# Patient Record
Sex: MALE | Race: WHITE | Employment: UNEMPLOYED | URBAN - METROPOLITAN AREA
[De-identification: names, ages, dates, MRNs, and addresses within clinical notes are randomized per-mention and may not be internally consistent; named-entity substitution may affect disease eponyms.]

---

## 2019-04-10 ENCOUNTER — HOSPITAL ENCOUNTER (OUTPATIENT)
Age: 24
Discharge: HOME | End: 2019-04-10

## 2020-10-02 ENCOUNTER — OFFICE VISIT (OUTPATIENT)
Dept: FAMILY MEDICINE CLINIC | Age: 25
End: 2020-10-02
Payer: COMMERCIAL

## 2020-10-02 VITALS
HEIGHT: 71 IN | WEIGHT: 288.8 LBS | BODY MASS INDEX: 40.43 KG/M2 | DIASTOLIC BLOOD PRESSURE: 78 MMHG | OXYGEN SATURATION: 99 % | TEMPERATURE: 98 F | HEART RATE: 76 BPM | SYSTOLIC BLOOD PRESSURE: 135 MMHG

## 2020-10-02 PROBLEM — M79.672 LEFT FOOT PAIN: Status: RESOLVED | Noted: 2020-10-02 | Resolved: 2020-10-02

## 2020-10-02 PROBLEM — M79.672 LEFT FOOT PAIN: Status: ACTIVE | Noted: 2020-10-02

## 2020-10-02 PROBLEM — F11.90 OPIOID USE DISORDER: Status: ACTIVE | Noted: 2020-10-02

## 2020-10-02 PROBLEM — L72.9 SCALP CYST: Status: ACTIVE | Noted: 2020-10-02

## 2020-10-02 PROBLEM — G89.29 CHRONIC FOOT PAIN, LEFT: Status: ACTIVE | Noted: 2020-10-02

## 2020-10-02 PROBLEM — E66.01 CLASS 3 SEVERE OBESITY DUE TO EXCESS CALORIES WITHOUT SERIOUS COMORBIDITY WITH BODY MASS INDEX (BMI) OF 40.0 TO 44.9 IN ADULT (HCC): Status: ACTIVE | Noted: 2020-10-02

## 2020-10-02 PROCEDURE — 99204 OFFICE O/P NEW MOD 45 MIN: CPT | Performed by: FAMILY MEDICINE

## 2020-10-02 RX ORDER — NAPROXEN 500 MG/1
TABLET ORAL
COMMUNITY
Start: 2020-08-21 | End: 2020-10-02

## 2020-10-02 SDOH — ECONOMIC STABILITY: FOOD INSECURITY: WITHIN THE PAST 12 MONTHS, THE FOOD YOU BOUGHT JUST DIDN'T LAST AND YOU DIDN'T HAVE MONEY TO GET MORE.: NEVER TRUE

## 2020-10-02 SDOH — ECONOMIC STABILITY: INCOME INSECURITY: HOW HARD IS IT FOR YOU TO PAY FOR THE VERY BASICS LIKE FOOD, HOUSING, MEDICAL CARE, AND HEATING?: NOT HARD AT ALL

## 2020-10-02 SDOH — HEALTH STABILITY: MENTAL HEALTH: HOW OFTEN DO YOU HAVE A DRINK CONTAINING ALCOHOL?: NEVER

## 2020-10-02 SDOH — ECONOMIC STABILITY: FOOD INSECURITY: WITHIN THE PAST 12 MONTHS, YOU WORRIED THAT YOUR FOOD WOULD RUN OUT BEFORE YOU GOT MONEY TO BUY MORE.: NEVER TRUE

## 2020-10-02 ASSESSMENT — PATIENT HEALTH QUESTIONNAIRE - PHQ9
SUM OF ALL RESPONSES TO PHQ QUESTIONS 1-9: 0
SUM OF ALL RESPONSES TO PHQ9 QUESTIONS 1 & 2: 0
1. LITTLE INTEREST OR PLEASURE IN DOING THINGS: 0
2. FEELING DOWN, DEPRESSED OR HOPELESS: 0
SUM OF ALL RESPONSES TO PHQ QUESTIONS 1-9: 0

## 2020-10-02 ASSESSMENT — ENCOUNTER SYMPTOMS
GASTROINTESTINAL NEGATIVE: 1
BOWEL INCONTINENCE: 0
RESPIRATORY NEGATIVE: 1
EYES NEGATIVE: 1
ALLERGIC/IMMUNOLOGIC NEGATIVE: 1
NAUSEA: 0
VOMITING: 0

## 2020-10-02 NOTE — PATIENT INSTRUCTIONS
Patient Education        Learning About Opioid Use Disorder  What is opioid use disorder? Opioid use disorder means that a person uses opioids even though it causes harm to themselves or others. It can range from mild to severe. The more signs of this disorder you have, the more severe it may be. Moderate to severe opioid use disorder is sometimes called addiction. People who have it may find it hard to control their use. This disorder can develop from the use of any type of opioid. Prescription ones include hydrocodone, oxycodone, fentanyl, and morphine. Heroin is an example of an illegal opioid. Opioids can be dangerous. Taking too much can cause:  · Trouble breathing. · Low blood pressure. · A low heart rate. · A coma. · Death. Many people with this disorder, and sometimes their families, feel embarrassed or ashamed. Don't let these feelings  the way of getting treatment. Remember that this disorder can happen to anyone who uses opioids, no matter what the reason. What are the signs? You may have opioid use disorder if two or more of the following are true. The more signs of this disorder you have, the more severe it may be. · You use larger amounts of opioids than you ever meant to. Or you've been using them for a longer period of time than you ever meant to. · You can't cut down or control your use. Or you constantly wish you could cut down. · You spend a lot of time getting or using opioids or recovering from the effects. · You have strong cravings for opioids. · You can no longer do your main jobs at work, at school, or at home. · You keep using, even though your opioid use hurts your relationships. · You have stopped doing important activities because of your opioid use. · You use opioids in situations where doing so is dangerous. · You keep using, even though you know it is causing health problems.   · You need more and more of the opioids to get the same effect, or you get less effect from the same amount over time. This is called tolerance. · You have uncomfortable symptoms when you stop using opioids or use less. This is called withdrawal.  If you're taking opioids as part of a supervised care plan, tolerance and withdrawal may not mean that you have opioid use disorder. How is opioid use disorder treated? Treatment usually includes medicines, group therapy, one or more types of counseling, and education. Medicines may be used to help you quit. They may help to control cravings, ease withdrawal symptoms, and prevent relapse. This treatment is called medication-assisted treatment, or MAT. During MAT, you take a medicine (usually methadone or buprenorphine) in place of the opioid you were using. Most people take the medicine for months or years as a part of the treatment, along with therapy or counseling. Treatment focuses on more than opioid use. It helps you cope with the anger, frustration, sadness, and disappointment that often happen when a person tries to stop using opioids. Treatment also looks at other parts of your life, like your relationships with friends and family, school and work, medical problems, and living situation. Treatment helps you find and manage problems. It helps you take control of your life so you don't have to depend on opioids. Opioid use disorder affects your whole family. Family counseling often is part of treatment. Urgent treatment for an overdose  Naloxone is a medicine that reverses the effects of an opioid overdose. If you take it or someone gives it to you soon enough after an overdose, it can save your life. Naloxone comes in a rescue kit you can carry with you. Ask your doctor or pharmacist about having a naloxone rescue kit on hand. Follow-up care is a key part of your treatment and safety. Be sure to make and go to all appointments, and call your doctor if you are having problems.  It's also a good idea to know your test results and keep a list of the medicines you take. Where can you learn more? Go to https://[email or web address] org and sign in to your [employer] account. Enter A129 in the [employer] box to learn more about \"Learning About Opioid Use Disorder. \"     If you do not have an account, please click on the \"Sign Up Now\" link. Current as of: August 22, 2019               Content Version: 12.5  © 9873-2385 [employer]. Care instructions adapted under license by [facility]. If you have questions about a medical condition or this instruction, always ask your healthcare professional. [address] any warranty or liability for your use of this information.

## 2020-10-02 NOTE — ASSESSMENT & PLAN NOTE
· I generally recommend that people of all ages try to get 150 minutes of physical activity per week and it doesnt matter how this totals up, in other words 30 minutes 5 days per week is as good as 50 minutes 3 days a week and so on. The level of activity should be such that it is able to get your heart rate up to 100 or more, for example a brisk walk should achieve this rate. · In terms of diet, I generally recommend low-carb and low-fat, trying to avoid processed foods wherever possible (anything that comes in a can or a box) which can be achieved by sticking to the outside walls of the grocery store where generally you will find fresh fruits/vegetables, meats, dairy, and frozen foods. · Try to avoid starches in the diet where possible and minimize bread, rice, potatoes, and pasta in the diet. Specifically try to avoid gluten, which even in people that dont have a [place] allergy, causes havoc in the small intestine and alters absorption of nutrients which can in turn lead to obesity.

## 2020-10-02 NOTE — PROGRESS NOTES
by Niall Butler DO on 10/2/20         Subjective     Lois Tolentino is a 22 y.o. male presenting today for   Chief Complaint   Patient presents with    New Patient     needs ref for rehab   . Epidermal Cyst  Patient complains of a subcutaneous nodule located over the scalp. This has been present for several years. There has been no associated symptoms of discharge, tenderness, sudden swelling, or pain. Patient does not have a history of epidermal inclusion cysts. About 10 years ago he had Curran fracture in left foot and casted and just started hurting again recently. About a month ago he went to urgent care. Xrays were negative. Started when he received Percocet medication for wisdom tooth removal. Then after that he was hanging with a bad crowd. Got up to 30 Percocet 30mg daily. Now he's down to 5 Percocet 5mg per day. Wants to go to [facility]. Has tried [facility] but kicked out b/c of insurance not paying for program.     Lois Tolentino is a 22 y.o. male here for discussion regarding weight loss. He has noted a weight gain of approximately 50 pounds over the last several years. He feels ideal weight is 200 pounds. History of eating disorders: none. There is a family history positive for obesity in the self. Previous treatments for obesity include self-directed dieting. Obesity associated medical conditions: none. Obesity associated medications: none. Cardiovascular risk factors besides obesity: none. Drug Problem   Pertinent negatives include no agitation, confusion, delusions, hallucinations, intoxication, loss of consciousness, seizures, self-injury, somnolence, violence or weakness. This is a chronic problem. The current episode started more than 1 year ago. The problem has been gradually improving since onset. Suspected agents include prescription drugs. Pertinent negatives include no bladder incontinence, bowel incontinence, fever, injury, nausea or vomiting.  Past treatments include outpatient rehab (has appointment but needs referral). Foot Pain    The pain is present in the left foot. This is a chronic problem. The current episode started more than 1 year ago. The problem occurs daily. The quality of the pain is described as aching. The pain is moderate. Pertinent negatives include no fever, inability to bear weight, itching, joint locking, joint swelling, limited range of motion, numbness, stiffness or tingling. The symptoms are aggravated by activity. He has tried rest, oral narcotics and NSAIDS (h/o leonard fracture and casting) for the symptoms. The treatment provided no relief. Review of Systems   Review of Systems   Constitutional: Negative. Negative for fever. HENT: Negative. Eyes: Negative. Respiratory: Negative. Cardiovascular: Negative. Gastrointestinal: Negative. Negative for bowel incontinence, nausea and vomiting. Endocrine: Negative. Genitourinary: Negative. Negative for bladder incontinence. Musculoskeletal: Negative. Negative for stiffness. Skin: Negative. Negative for itching. Allergic/Immunologic: Negative. Neurological: Negative. Negative for tingling, seizures, loss of consciousness, weakness and numbness. Hematological: Negative. Psychiatric/Behavioral: Negative. Negative for agitation, confusion, hallucinations and self-injury. All other systems reviewed and are negative. Medications     No current outpatient medications on file. No current facility-administered medications for this visit. Past History    Past Medical History:   has no past medical history on file. Social History:   reports that he has never smoked. He has never used smokeless tobacco. He reports current drug use. Drug: Marijuana. He reports that he does not drink alcohol. Family History: History reviewed. No pertinent family history. Surgical History: History reviewed. No pertinent surgical history.      Physical Examination      Vitals:  /78   Pulse 76   Temp 98 °F (36.7 °C) (Temporal)   Ht 5' 11\" (1.803 m)   Wt 288 lb 12.8 oz (131 kg)   SpO2 99%   BMI 40.28 kg/m²     Physical Exam  Vitals signs and nursing note reviewed. Constitutional:       General: He is not in acute distress. Appearance: Normal appearance. He is obese. He is not ill-appearing, toxic-appearing or diaphoretic. HENT:      Head: Normocephalic and atraumatic. Right Ear: Tympanic membrane, ear canal and external ear normal. There is no impacted cerumen. Left Ear: Tympanic membrane, ear canal and external ear normal. There is no impacted cerumen. Nose: Nose normal. No congestion or rhinorrhea. Mouth/Throat:      Mouth: Mucous membranes are moist.      Pharynx: Oropharynx is clear. No oropharyngeal exudate or posterior oropharyngeal erythema. Eyes:      General: No scleral icterus. Right eye: No discharge. Left eye: No discharge. Extraocular Movements: Extraocular movements intact. Conjunctiva/sclera: Conjunctivae normal.      Pupils: Pupils are equal, round, and reactive to light. Neck:      Musculoskeletal: Normal range of motion and neck supple. No neck rigidity or muscular tenderness. Cardiovascular:      Rate and Rhythm: Normal rate and regular rhythm. Pulses: Normal pulses. Heart sounds: Normal heart sounds. No murmur. No friction rub. No gallop. Pulmonary:      Effort: Pulmonary effort is normal. No respiratory distress. Breath sounds: Normal breath sounds. No stridor. No wheezing, rhonchi or rales. Chest:      Chest wall: No tenderness. Abdominal:      General: Bowel sounds are normal. There is no distension. Palpations: Abdomen is soft. There is no mass. Tenderness: There is no abdominal tenderness. There is no right CVA tenderness, left CVA tenderness, guarding or rebound. Hernia: No hernia is present.    Musculoskeletal: Normal range of motion. General: No deformity or signs of injury. Left foot: Normal range of motion and normal capillary refill. Tenderness present. No bony tenderness, swelling, crepitus, deformity or laceration. Feet:    Skin:     General: Skin is warm. Capillary Refill: Capillary refill takes less than 2 seconds. Coloration: Skin is not jaundiced or pale. Findings: No bruising, erythema, lesion or rash. Comments: 3 subcutaneous nodules as marked. 1-3cm in diameter   Neurological:      General: No focal deficit present. Mental Status: He is alert and oriented to person, place, and time. Mental status is at baseline. Motor: No weakness. Coordination: Coordination normal.      Gait: Gait normal.      Deep Tendon Reflexes: Reflexes normal.   Psychiatric:         Mood and Affect: Mood normal.         Behavior: Behavior normal.         Thought Content: Thought content normal.         Judgment: Judgment normal.         Labs/Imaging/Diagnostics   Labs:  No results found for: CMPWITHGFR, CBCAUTODIF, TSHFT4, LABA1C, LIPIDPAN    Imaging Last 24 Hours:  No image results found.

## 2021-01-26 ENCOUNTER — TELEPHONE (OUTPATIENT)
Dept: SURGERY | Age: 26
End: 2021-01-26

## 2021-01-26 NOTE — TELEPHONE ENCOUNTER
Received a referral by fax for patient to see Dr. Dru Mccann. He was called 3x previously for appt. I called today and his voicemail is full.   He needs to make appt with Dr. Dru Mccann

## 2021-02-01 ENCOUNTER — OFFICE VISIT (OUTPATIENT)
Dept: PODIATRY | Age: 26
End: 2021-02-01
Payer: COMMERCIAL

## 2021-02-01 VITALS — TEMPERATURE: 98.4 F | RESPIRATION RATE: 15 BRPM | HEIGHT: 71 IN | BODY MASS INDEX: 37.38 KG/M2 | WEIGHT: 267 LBS

## 2021-02-01 DIAGNOSIS — M76.72 PERONEAL TENDONITIS, LEFT: ICD-10-CM

## 2021-02-01 DIAGNOSIS — M79.662 PAIN IN LEFT LOWER LEG: Primary | ICD-10-CM

## 2021-02-01 PROCEDURE — 99203 OFFICE O/P NEW LOW 30 MIN: CPT | Performed by: PODIATRIST

## 2021-02-01 RX ORDER — BUPRENORPHINE AND NALOXONE 4; 1 MG/1; MG/1
1 FILM, SOLUBLE BUCCAL; SUBLINGUAL DAILY
COMMUNITY

## 2021-02-01 NOTE — PROGRESS NOTES
vomiting. Lower Extremity Physical Examination:   Vitals:   Vitals:    02/01/21 1015   Resp: 15   Temp: 98.4 °F (36.9 °C)     General: AAO x 3 in NAD. Dermatologic Exam:  Skin lesion/ulceration Absent . Skin No rashes or nodules noted. .       Musculoskeletal:     1st MPJ ROM decreased, Bilateral.  Muscle strength 5/5, Bilateral.  Pain present upon palpation of left ankle along peroneal tendon. Medial longitudinal arch, Bilateral WNL. Ankle ROM pain with eversion and inversion , left. Dorsally contracted digits absent digits 1-5 Bilateral.     Vascular: DP and PT pulses palpable 2/4, Bilateral.  CFT <3 seconds, Bilateral.  Hair growth present to the level of the digits, Bilateral.  Edema noted to left ankle. Varicosities absent, Bilateral. Erythema absent, Bilateral    Neurological: Sensation intact to light touch to level of digits, Bilateral.  Protective sensation intact 10/10 sites via 5.07/10g [place] Monofilament, Bilateral.  negative Tinel's, Bilateral.  negative Valleix sign, Bilateral.      Integument: Warm, dry, supple, Bilateral.  Open lesion absent, Bilateral.  Interdigital maceration absent to web spaces 1-4, Bilateral.  Nails are normal in length, thickness and color 1-5 bilateral.  Fissures absent, Bilateral.       Asessment: Patient is a 22 y.o. male with:    Diagnosis Orders   1. Pain in left lower leg  MRI ANKLE LEFT WO CONTRAST   2. Peroneal tendonitis, left  MRI ANKLE LEFT WO CONTRAST         Plan: Patient examined and evaluated. Current condition and treatment options discussed in detail. Discussed conservative and surgical options with the patient. Advised pt to wear CAM boot at all times when WB to LLE. Pt to limit walking. Ice and elevate at home. May take NSAID as needed. Recommend MRI of left ankle for further evaluation and to r/o injury to soft tissue structures. will await for MRI results.      All labs were reviewed and all imagining including the above

## 2021-02-04 RX ORDER — NAPROXEN 500 MG/1
500 TABLET ORAL 2 TIMES DAILY PRN
Qty: 60 TABLET | Refills: 0 | Status: SHIPPED | OUTPATIENT
Start: 2021-02-04

## 2021-02-19 ENCOUNTER — TELEPHONE (OUTPATIENT)
Dept: PODIATRY | Age: 26
End: 2021-02-19

## 2021-02-19 NOTE — TELEPHONE ENCOUNTER
Left message on [email or web address] that the MRI was approved, and to call and schedule an  Appointment to review MRI

## 2021-02-24 ENCOUNTER — OFFICE VISIT (OUTPATIENT)
Dept: SURGERY | Age: 26
End: 2021-02-24
Payer: COMMERCIAL

## 2021-02-24 VITALS
HEART RATE: 75 BPM | DIASTOLIC BLOOD PRESSURE: 76 MMHG | HEIGHT: 72 IN | OXYGEN SATURATION: 96 % | BODY MASS INDEX: 36.87 KG/M2 | SYSTOLIC BLOOD PRESSURE: 120 MMHG | WEIGHT: 272.2 LBS

## 2021-02-24 DIAGNOSIS — L72.11 PILAR CYSTS: Primary | ICD-10-CM

## 2021-02-24 PROCEDURE — 99203 OFFICE O/P NEW LOW 30 MIN: CPT | Performed by: PLASTIC SURGERY

## 2021-02-24 RX ORDER — DOCUSATE SODIUM 100 MG/1
100 CAPSULE, LIQUID FILLED ORAL PRN
COMMUNITY
Start: 2021-02-07

## 2021-02-24 NOTE — PROGRESS NOTES
[address] [facility]  [facility] [id number]       History and Physical     Chief Complaint   Patient presents with    New Patient     3 cyst on scalp. Pt states have been the same size for 10 years. HPI:   Yolanda Ruiz is a 22 y.o. male who presents with multiple cysts of the scalp. Patient states that they have been present for approximately 10 years. Patient denies any pain associated with the area. Patient is here for evaluation and treatment. . Patient states that he continues to have of the cyst there is nothing that improves the area. There is nothing that makes them go away. Medications:     Current Outpatient Medications   Medication Sig Dispense Refill     MG capsule Take 100 mg by mouth as needed      Melatonin 5 MG CAPS Take 5 mg by mouth as needed      naproxen (NAPROSYN) 500 MG tablet Take 1 tablet by mouth 2 times daily as needed for Pain 60 tablet 0    buprenorphine-naloxone (SUBOXONE) 4-1 MG FILM SL film Place 1 Film under the tongue daily. No current facility-administered medications for this visit. Allergies:   No Known Allergies  Review of Systems:   Constitutional: Negative for fever, chills, fatigue and unexpected weight change. HENT: Negative for hearing loss, sore throat and facial swelling. Eyes: Negative for pain and discharge. Respiratory: Negative for cough and shortness of breath. Cardiovascular: Negative for chest pain. Gastrointestinal: Negative for nausea, vomiting, diarrhea and constipation. Skin: Negative for pallor and rash. Neurological: Negative for seizures, syncope and numbness. Hematological: Does not bruise/bleed easily. Psychiatric/Behavioral: Negative for behavioral problems. Patient has a history of drug abuse.     Past Medical History:   Diagnosis Date    Drug abuse [facility])      Past Surgical History:   Procedure Laterality Date    WISDOM TOOTH Pulmonary/Chest: Effort normal. No respiratory distress. Neurological: Alert and oriented to person, place, and time. Skin:    Left anterior scalp cyst    Right  anterior scalp cyst    Posterior scalp cyst         Psychiatric: Normal mood and affect. Behavior is normal    Imaging:   [unfilled]        Impression/Plan:      Diagnosis Orders   1. Pilar cysts       Patient Active Problem List   Diagnosis    Opioid use disorder ([facility] [address]    Scalp cyst    Chronic foot pain, left    Class 3 severe obesity due to excess calories without serious comorbidity with body mass index (BMI) of 40.0 to 44.9 in adult [facility])         Plan:  Patient with pilar cyst of the scalp. The cysts are located in the following areas: On the right anterior scalp, the left anterior scalp and posterior scalp. The risk of excision was discussed with the patient. I discussed in office excision versus or excision. Patient elected to have an in office procedure. All questions were answered. The following information was discussed with the patient, but this is not an all-inclusive-other issue were also discussed with patient. GENERAL INFORMATION  The surgical removal of skin lesions and tumors is frequently performed by plastic surgeons. Certain skin lesions and skin tumors will not disappear spontaneously, surgical removal is a treatment option. There are many different techniques for removing skin lesions and skin tumors. ALTERNATIVE TREATMENTS  Alternative forms of management consist of not treating the skin lesion/skin tumor condition. Removal of skin lesions and some superficial skin tumors may be accomplished by other treatments including the use of liquid nitrogen (freezing), lasers, topical medications, and electric cautery. Risks and potential complications are associated with alternative forms of treatment. Deeper tumors cannot be treated by this.   RISKS OF SKIN LESION/SKIN TUMOR SURGERY    I have explained to the patient that every surgical procedure involves a certain amount of risk and it is important that an understanding of these risks and the possible complications associated with them is understood. In addition, every procedure has limitations. An individual's choice to undergo a surgical procedure is based on the comparison of the risk to potential benefit. Although some of patients do not experience these complications. Bleeding- It is possible, to experience a bleeding episode during or after surgery. Intraoperative blood transfusions may be required. Should post-operative bleeding occur, it may require an emergency treatment to drain the accumulated blood or blood transfusion. Do not take any aspirin or anti-inflammatory medications for ten days before or after surgery, as this may increase the risk of bleeding. Non-prescription herbs and dietary supplements can increase the risk of surgical bleeding. Hematoma can occur at any time following injury. If blood transfusions are necessary to treat blood loss, there is the risk of blood-related infections such as hepatitis and HIV (AIDS). Heparin medications that are used to prevent blood clots in veins can produce bleeding and decreased blood platelets. Please check with the physician who prescribed that blood thinners such as aspirin Coumadin etc. Prior to stopping them. Infection- Infection can occur after surgery. Should an infection occur, additional treatment including antibiotics, hospitalization, or additional surgery may be necessary. Scarring- All surgery leaves scars, some more visible than others. Although wound healing after a surgical procedure is expected, abnormal scars may occur within the skin and deeper tissues. Scars may be unattractive and of different color than the surrounding skin tone. Scar appearance may also vary within the same scar.   There is the possibility of visible marks from sutures used to close the wound after the removal of skin lesions and tumors. There is the possibility that scars may limit motion and function. In some cases, scars may require surgical revision or treatment. Obesity: If you're BMI is greater than 30 you may have a higher chance of complications. This may include but not limited to wound healing and infections. Also, if you have other medical problems such as diabetes and hypertension it may affect your healing as well as your surgical results in bleeding. Damage to Deeper Structures- There is the potential for injury to deeper structures including nerves, blood vessels, muscles, and lungs (pneumothorax) during any surgical procedure. The potential for this to occur varies according to where on the body surgery is being performed. Injury to deeper structures may be temporary or permanent. Cancer- In some situations, a skin lesion or tumor that appears to be benign may be determined to be cancerous after laboratory analysis. Additional treatments or surgery may be necessary. Recurrence- In some situation, skin lesions and tumors can recur after surgical excision. Additional treatment or secondary surgery may be necessary. Skin Discoloration / Swelling- Some bruising and swelling normally occurs following surgery. The skin in or near the surgical site can appear either lighter or darker than surrounding skin. Although uncommon, swelling and skin discoloration may persist for long periods of time and, in rare situations, may be permanent. Skin Sensitivity- Itching, tenderness, or exaggerated responses to hot or cold temperatures may occur after surgery. Usually this resolves during healing, but in some situations, it may be chronic, permanent. Pain- You will experience pain after your surgery. Pain of varying intensity and duration may occur and persist after surgery. Chronic pain may occur from nerves becoming trapped in scar tissue.   Sutures- Some surgical techniques use deep sutures. You may notice these sutures after your surgery. Sutures may spontaneously poke through the skin, become visible or produce irritation that requires removal.  Delayed Healing- Wound disruption or delayed wound healing is possible. Some areas of the skin may not heal normally and may take a long time to heal.  Some areas of skin may die, requiring frequent dressing changes or further surgery to remove the non-healed tissue. Smokers have a greater risk of skin loss and wound healing complications. Skin Contour Irregularities- Contour irregularities and depressions may occur after surgery. Visible and palpable wrinkling of skin can occur. Residual skin irregularities at the ends of the incisions or dog ears are always a possibility and may require additional surgery. This may improve with time, or it can be surgically corrected. Allergic Reactions- In some cases, local allergies to tape, suture materials and glues, blood products, topical preparations or injected agents have been reported. Serious systemic reactions including shock (anaphylaxis) may occur to drugs used during surgery and prescription medications. Allergic reactions may require additional treatment. Surgical Anesthesia- Both local and general anesthesia involve risk. There is the possibility of complications, injury, and even death from all forms of surgical anesthesia or sedation. Change in Skin Sensation- It is common to experience diminished (or loss) of skin sensation in areas that have had surgery. Diminished (or complete loss of skin sensation) may not totally resolve. Shock- In rare circumstances, your surgical procedure can cause severe trauma, particularly when multiple or extensive procedures are performed. Although serious complications are infrequent, infections or excessive fluid loss can lead to severe illness and even death.   If surgical shock occurs, hospitalization and additional treatment would be necessary. Unsatisfactory Result- There is no guarantee or warranty expressed or implied, on the results that may be obtained. There is the possibility of a poor result from the removal of skin lesions and tumors. This would include risks such as unacceptable visible deformities, loss of function, poor healing, wound disruption, skin death and loss of sensation. You may be disappointed with the results of reconstructive surgery. It may be necessary to perform additional surgery to improve your results. Cardiac and Pulmonary Complications- Surgery, especially longer procedures, may be associated with the formation of, or increase in, blood clots in the venous system. Pulmonary complications may occur secondarily to both blood clots (pulmonary emboli), fat deposits (fat emboli) or partial collapse of the lungs after general anesthesia. Pulmonary and fat emboli can be life-threatening or fatal in some circumstances. Air travel, inactivity and other conditions may increase the incidence of blood clots traveling to the lungs causing a major blood clot that may result in death. It is important to discuss with your physician any past history of blood clots or swollen legs that may contribute to this condition. Cardiac complications are a risk with any surgery and anesthesia, even in patients without symptoms. If you experience shortness of breath, chest pains, or unusual heart beats, seek medical attention immediately. Should any of these complications occur, you may require hospitalization and additional treatment. Smoking, Second-Hand Smoke Exposure, Nicotine Products (Patch, Gum, Nasal Spray)-   Patients who are currently smoking, use tobacco products, or nicotine products (patch, gum, or nasal spray) are at a greater risk for significant surgical complications of skin dying, delayed healing, and additional scarring.   Individuals exposed to second-hand smoke are also at potential risk for similar complications attributable to nicotine exposure. Additionally, smoking may have a significant negative effect on anesthesia and recovery from anesthesia, with coughing and possibly increased bleeding. Individuals who are not exposed to tobacco smoke or nicotine-containing products have a significantly lower risk of this type of complication. It is important to refrain from smoking at least 8-week weeks before and after surgery. This may apply to secondhand smoking. Mental Health Disorders and Surgery- It is important that all patients seeking to undergo surgery have realistic expectations that focus on improvement rather than perfection. Complications or less than satisfactory results are sometimes unavoidable, may require additional surgery and often are stressful. Please openly discuss with your surgeon, prior to surgery, any history that you may have of significant emotional depression or mental health disorders. Although many individuals may benefit psychologically from the results of surgery, effects on mental health cannot be accurately predicted. Medications- There are many adverse reactions that occur as the result of taking over the counter, herbal, and/or prescription medications. Be sure to check with your physician about any drug interactions that may exist with medications which you are already taking. If you have an adverse reaction, stop the drugs immediately and call your plastic surgeon for further instructions. If the reaction is severe, go immediately to the nearest emergency room. When taking the prescribed pain medications after surgery, realize that they can affect your thought process and coordination. Do not drive, do not operate complex equipment, do not make any important decisions, and do not drink any alcohol while taking these medications. Be sure to take your prescribed medication only as directed.     ADDITIONAL SURGERY NECESSARY  Should complications occur, additional surgery or other treatments may be necessary. Even though risks and complications occur infrequently, the risks cited are the ones that are particularly associated with skin lesion and skin tumor removal.  Other complications and risks can occur but are even more uncommon. The practice of medicine and surgery is not an exact science. Although good results are expected, there is no guarantee or warranty expressed or implied, on the results that may be obtained. PATIENT COMPLIANCE   Follow all physician instructions carefully; this is essential for the success of your outcome. It is important that the surgical incisions are not subjected to excessive force, swelling, abrasion, or motion during the time of healing. Protective dressings and drains should not be removed unless instructed by your plastic surgeon. Successful post-operative function depends on both surgery and subsequent care. Physical activity that increases your pulse or heart rate may cause bruising, swelling, fluid accumulation and the need for return to surgery. It is wise to refrain from intimate physical activities after surgery until your physician states it is safe. It is important that you participate in follow-up care, return for aftercare, and promote your recovery after surgery.             Electronically signed by:  Renée Clifton MD 2/24/2021

## 2021-02-24 NOTE — LETTER
[facility]  [address] [facility] [address]  Phone: [phone]  Fax: [phone]    No ref. provider found        February 24, 2021       Patient: Petra Taylor   MR Number: [id number]   YOB: 1995   Date of Visit: 2/24/2021       Dear Dr. Cintia Chávez ref. provider found: Thank you for the request for consultation for Prashant Felder to me for the evaluation of pilar cyst of the scalp. Below are the relevant portions of my assessment and plan of care. Plan:  Patient with pilar cyst of the scalp. The cysts are located in the following areas: On the right anterior scalp, the left anterior scalp and posterior scalp. The risk of excision was discussed with the patient. I discussed in office excision versus or excision. Patient elected to have an in office procedure. All questions were answered. If you have questions, please do not hesitate to call me. I look forward to following Vick Kc along with you.     Sincerely,        Mary Rodriguez MD    CC providers:  Adry Huitron DO  [address]  [address] [address]  [place]

## 2021-03-03 ENCOUNTER — OFFICE VISIT (OUTPATIENT)
Dept: SURGERY | Age: 26
End: 2021-03-03
Payer: COMMERCIAL

## 2021-03-03 ENCOUNTER — HOSPITAL ENCOUNTER (OUTPATIENT)
Age: 26
Setting detail: SPECIMEN
Discharge: HOME | End: 2021-03-03
Payer: COMMERCIAL

## 2021-03-03 ENCOUNTER — TELEPHONE (OUTPATIENT)
Dept: SURGERY | Age: 26
End: 2021-03-03

## 2021-03-03 VITALS
HEIGHT: 72 IN | BODY MASS INDEX: 36.73 KG/M2 | SYSTOLIC BLOOD PRESSURE: 118 MMHG | WEIGHT: 271.2 LBS | DIASTOLIC BLOOD PRESSURE: 79 MMHG | HEART RATE: 82 BPM | OXYGEN SATURATION: 96 %

## 2021-03-03 DIAGNOSIS — L72.9 CYST OF SKIN: Primary | ICD-10-CM

## 2021-03-03 DIAGNOSIS — G89.18 POST-OP PAIN: Primary | ICD-10-CM

## 2021-03-03 DIAGNOSIS — G89.18 POST-OP PAIN: ICD-10-CM

## 2021-03-03 PROCEDURE — 11422 EXC H-F-NK-SP B9+MARG 1.1-2: CPT | Performed by: PLASTIC SURGERY

## 2021-03-03 PROCEDURE — 11423 EXC H-F-NK-SP B9+MARG 2.1-3: CPT | Performed by: PLASTIC SURGERY

## 2021-03-03 PROCEDURE — 13121 CMPLX RPR S/A/L 2.6-7.5 CM: CPT | Performed by: PLASTIC SURGERY

## 2021-03-03 RX ORDER — LIDOCAINE 50 MG/G
1 PATCH TOPICAL DAILY
Qty: 10 PATCH | Refills: 0 | Status: SHIPPED | OUTPATIENT
Start: 2021-03-03 | End: 2021-03-03 | Stop reason: SDUPTHER

## 2021-03-03 RX ORDER — LIDOCAINE 50 MG/G
1 PATCH TOPICAL DAILY
Qty: 10 PATCH | Refills: 0 | Status: SHIPPED | OUTPATIENT
Start: 2021-03-03 | End: 2021-03-13

## 2021-03-03 RX ORDER — LIDOCAINE HYDROCHLORIDE AND EPINEPHRINE 10; 10 MG/ML; UG/ML
5 INJECTION, SOLUTION INFILTRATION; PERINEURAL ONCE
Status: COMPLETED | OUTPATIENT
Start: 2021-03-03 | End: 2021-03-03

## 2021-03-03 RX ORDER — DOXYCYCLINE HYCLATE 100 MG
100 TABLET ORAL 2 TIMES DAILY
Qty: 28 TABLET | Refills: 0 | Status: SHIPPED | OUTPATIENT
Start: 2021-03-03 | End: 2021-03-17

## 2021-03-03 RX ADMIN — LIDOCAINE HYDROCHLORIDE AND EPINEPHRINE 5 ML: 10; 10 INJECTION, SOLUTION INFILTRATION; PERINEURAL at 16:23

## 2021-03-03 NOTE — TELEPHONE ENCOUNTER
Pt would like something prescribed for the pain as well. He would like it sent to [facility] and [facility].

## 2021-03-03 NOTE — PROGRESS NOTES
[facility]  [address] [place] [id number]         Office Procedure Note     Name: Petra Taylor Date/Time of Admission: No admission date for patient encounter. MRN: [id number] Attending Provider: No att. providers found   Room/Bed: @RB@ /Age:  y.o. Date of Surgery: [unfilled] Surgeon: Mary Rodriguez MD   [facility] PCP: Adry Huitron DO     Pre-Op DX:   Mass/cyst left posterior scalp  Mass/cyst right anterior scalp  Mass/cyst left anterior scalp    Post-Op DX:     Mass/cyst left posterior scalp  Mass/cyst right anterior scalp  Mass/cyst left anterior scalp    Operative Procedure:     Excision of left posterior scalp mass measuring 2.5 cm x 2 cm with complex closure of defect size measuring 2.7 x 2.2 cm  Excision of right anterior scalp mass measuring 2 x 1.5 cm with complex closure of defect size measuring 2.2 x 1.7 cm  Excision of left anterior scalp mass measuring 3 x 2 cm with complex closure defect size measuring 2.2 x 2.2 cm    ANESTHESIA:   5 cc of 1% lidocaine with epinephrine  SPECIMENS:    Left posterior scalp mass  Right anterior scalp mass  Left anterior scalp mass    INDICATION FOR PROCEDURE:  The patient is a 22 y.o. male   All the risks, benefits, and alternative treatments were explained to the patient and an informed consent was obtained. DESCRIPTION OF PROCEDURE:  The patient was marked. A timeout was performed. Local anesthetic was injected  All areas were tested. After it was determined it was adequate anesthesia, then attention was turned to left posterior scalp. Then using a #15 blade an elliptical incision was made. Dissection was made down to the cyst.  Then using pickups and scissors the cyst was circumferentially excised. Then the area was irrigated. Hemostasis was achieved. Then using 3-0 Vicryl in an interrupted manner to deep tissue was closed.   Then using 40 Prolene in an interrupted manner, the skin was closed. Local anesthetic was injected  All areas were tested. After it was determined it was adequate anesthesia, then attention was turned to left anterior scalp. Then using a #15 blade an elliptical incision was made. Dissection was made down to the cyst.  Then using pickups and scissors the cyst was circumferentially excised. Then the area was irrigated. Hemostasis was achieved. Then using 3-0 Vicryl in an interrupted manner to deep tissue was closed. Then using 40 Prolene in an interrupted manner, the skin was closed. Local anesthetic was injected  All areas were tested. After it was determined it was adequate anesthesia, then attention was turned to right anterior scalp. Then using a #15 blade an elliptical incision was made. Dissection was made down to the cyst.  Then using pickups and scissors the cyst was circumferentially excised. Then the area was irrigated. Hemostasis was achieved. Then using 3-0 Vicryl in an interrupted manner to deep tissue was closed. Then using 40 Prolene in an interrupted manner, the skin was closed. The patient tolerated procedure well. Postoperative instructions and follow-up appointment was provided.

## 2021-03-05 ENCOUNTER — TELEPHONE (OUTPATIENT)
Dept: SURGERY | Age: 26
End: 2021-03-05

## 2021-03-05 NOTE — TELEPHONE ENCOUNTER
Patient called back. He told me that he was still having pain, and thinks he needs something for that pain to be sent to his pharmacy, if possible. He stated that he thought he would only need enough for a couple days.

## 2021-03-05 NOTE — TELEPHONE ENCOUNTER
I received a letter from this patient's provider at [facility] regarding patient taking medications to treat pain associated with his procedure, here, 3/3/2021.  The letter is is scanned into this patient's chart

## 2021-03-08 ENCOUNTER — TELEPHONE (OUTPATIENT)
Dept: SURGERY | Age: 26
End: 2021-03-08

## 2021-03-08 ENCOUNTER — OFFICE VISIT (OUTPATIENT)
Dept: PODIATRY | Age: 26
End: 2021-03-08
Payer: COMMERCIAL

## 2021-03-08 VITALS — RESPIRATION RATE: 16 BRPM | TEMPERATURE: 97.8 F | HEIGHT: 72 IN | BODY MASS INDEX: 36.7 KG/M2 | WEIGHT: 271 LBS

## 2021-03-08 DIAGNOSIS — M79.662 PAIN IN LEFT LOWER LEG: Primary | ICD-10-CM

## 2021-03-08 DIAGNOSIS — M76.72 PERONEAL TENDONITIS, LEFT: ICD-10-CM

## 2021-03-08 DIAGNOSIS — R60.0 EDEMA OF LOWER EXTREMITY: ICD-10-CM

## 2021-03-08 DIAGNOSIS — R26.2 DIFFICULTY WALKING: ICD-10-CM

## 2021-03-08 DIAGNOSIS — G89.18 POST-OP PAIN: Primary | ICD-10-CM

## 2021-03-08 LAB — DERMATOLOGY PATHOLOGY REPORT: NORMAL

## 2021-03-08 PROCEDURE — 99213 OFFICE O/P EST LOW 20 MIN: CPT | Performed by: PODIATRIST

## 2021-03-08 PROCEDURE — L4361 PNEUMA/VAC WALK BOOT PRE OTS: HCPCS | Performed by: PODIATRIST

## 2021-03-08 RX ORDER — HYDROCODONE BITARTRATE AND ACETAMINOPHEN 5; 325 MG/1; MG/1
1 TABLET ORAL EVERY 6 HOURS PRN
Qty: 12 TABLET | Refills: 0 | Status: SHIPPED | OUTPATIENT
Start: 2021-03-08 | End: 2021-03-11

## 2021-03-08 NOTE — TELEPHONE ENCOUNTER
I called patient to give him his pathology results. He said the norco really helped his pain, and that his mother picked up some OTC lidocaine patches that he wears at night. I will send in the appeal for the 5% patches.

## 2021-03-08 NOTE — TELEPHONE ENCOUNTER
Please appeal the lidocaine patches. Call and find out what information they need. Please send him the information that is requested.

## 2021-03-08 NOTE — PROGRESS NOTES
[facility] PHYSICIANS  [facility]  [address] [place] [address]  Dept: [phone]  Dept Fax: [phone]    RETURN PATIENT PROGRESS NOTE  Date of patient's visit: 3/8/2021  Patient's Name:  Arnaldo Gutiérrez YOB: 1995            Patient Care Team:  Vesta Parker DO as PCP - General (Family Medicine)  Vesta Parker DO as PCP - [facility] Empaneled Provider  Roby Monique DPM as Physician (Podiatry)       Madeline Flanagan Ravenms 22 y.o. male that presents for follow-up of pain to left ankle  Chief Complaint   Patient presents with    Foot Pain     left lower leg     Pt's primary care physician is Vesta Parker DO last seen 10/2/20  Symptoms began 1 year(s) ago and are unchanged . Patient relates pain is Present to left ankle. Pain is worse with walking. Pain is rated 5 out of 10 and is described as intermittent. Treatments prior to today's visit include: none. Currently denies F/C/N/V. He would like to discuss MRI results today. No Known Allergies    Past Medical History:   Diagnosis Date    Drug abuse ([facility] [address]        Prior to Admission medications    Medication Sig Start Date End Date Taking? Authorizing Provider   HYDROcodone-acetaminophen (NORCO) 5-325 MG per tablet Take 1 tablet by mouth every 6 hours as needed for Pain for up to 3 days. Intended supply: 7 days.  Take lowest dose possible to manage pain 3/8/21 3/11/21  Iftikhar Reeves MD   doxycycline hyclate (VIBRA-TABS) 100 MG tablet Take 1 tablet by mouth 2 times daily for 14 days 3/3/21 3/17/21  Iftikhar Reeves MD   lidocaine (LIDODERM) 5 % Place 1 patch onto the skin daily for 10 days 12 hours on, 12 hours off. 3/3/21 3/13/21  Iftikhar Reeves MD    MG capsule Take 100 mg by mouth as needed 2/7/21   Historical Provider, MD   Melatonin 5 MG CAPS Take 5 mg by mouth as needed 2/14/21   Historical Provider, MD   naproxen (NAPROSYN) 500 MG tablet Take 1 tablet by mouth 2 times daily as needed for Pain 2/4/21   Arnoldo Labrum, DPM   buprenorphine-naloxone (SUBOXONE) 4-1 MG FILM SL film Place 1 Film under the tongue daily. Historical Provider, MD       Review of Systems    Review of Systems:  History obtained from chart review and the patient  General ROS: negative for - chills, fatigue, fever, night sweats or weight gain  Constitutional: Negative for chills, diaphoresis, fatigue, fever and unexpected weight change. Musculoskeletal: Positive for arthralgias, gait problem and joint swelling. Neurological ROS: negative for - behavioral changes, confusion, headaches or seizures. Negative for weakness and numbness. Dermatological ROS: negative for - mole changes, rash  Cardiovascular: Negative for leg swelling. Gastrointestinal: Negative for constipation, diarrhea, nausea and vomiting. Lower Extremity Physical Examination:     Vitals:   Vitals:    03/08/21 0942   Temp: 97.8 °F (36.6 °C)     General: AAO x 3 in NAD. Dermatologic Exam:  Skin lesion/ulceration Absent . Skin No rashes or nodules noted. .       Musculoskeletal:     1st MPJ ROM decreased, Bilateral.  Muscle strength 5/5, Bilateral.  Pain present upon palpation of left ankle laterally and along peroneal tendon. Medial longitudinal arch, Bilateral increased. Ankle ROM pain with eversion and inversion, left. Dorsally contracted digits absent digits 1-5 Bilateral.     Vascular: DP and PT pulses palpable 2/4, Bilateral.  CFT <3 seconds, Bilateral.  Hair growth present to the level of the digits, Bilateral.  Edema noted to lateral left ankle.   Varicosities absent, Bilateral. Erythema absent, Bilateral    Neurological: Sensation intact to light touch to level of digits, Bilateral.  Protective sensation intact 10/10 sites via 5.07/10g Sabael-Claudio Monofilament, Bilateral.  negative Tinel's, Bilateral.  negative Valleix sign, Bilateral.      Integument: Warm, dry, supple, Bilateral.  Open lesion absent, Bilateral.  Interdigital maceration absent to web spaces 1-4, Bilateral.  Nails are normal in length, thickness and color 1-5 bilateral.  Fissures absent, Bilateral.     MRI Left ankle:  1. Tiny ankle effusion  2. Areas of tendinosis and tenosynovitis without tear of peroneus longus    Asessment: Patient is a 22 y.o. male with:    Diagnosis Orders   1. Pain in left lower leg  OK PNEUMA/VAC WALK BOOT PRE OTS   2. Peroneal tendonitis, left  OK PNEUMA/VAC WALK BOOT PRE OTS   3. Edema of lower extremity  OK PNEUMA/VAC WALK BOOT PRE OTS   4. Difficulty walking  OK PNEUMA/VAC WALK BOOT PRE OTS       Plan: Patient examined and evaluated. Current condition and treatment options discussed in detail. .I have dispensed a pneumatic equalizer walker. Due to the patient's diagnosis and related symptoms this is medically necessary for the treatment. The function of this device is to restrict and limit motion and provide stabilization and compression to the affected area. This device will allow the patient to slowly increase strength and ROM of the extremity. Specific instructions on weight bearing and ROM exercises were discussed and given to the patient. The goals and function of this device was explained in detail to the patient. Upon gait analysis, the device appeared to be fitting well and the patient states that the device is comfortable at this time. The patient was shown how to properly apply, wear, and care for the device. The patient was able to apply properly and ambulate without distress. At that time, the device was  dispensed, it was suitable for the patient's condition and was not substandard. No guarantees were given and the precautions were reviewed. Written instructions and warranty information was given along with the list of the twenty-one (21) Durable Medical Equipment Supplier Guidelines. All the questions were answered satisfactorily     Advised pt to limit walking. May take NSAID as needed. Ice and elevate at home.   Verbal and written

## 2021-03-08 NOTE — TELEPHONE ENCOUNTER
I received a medication denial letter from [employer], for lidocaine patches. Patient was prescribed Norco for 3 days. Would you like this appealed? The letter has been scanned in media.

## 2021-03-17 ENCOUNTER — OFFICE VISIT (OUTPATIENT)
Dept: SURGERY | Age: 26
End: 2021-03-17

## 2021-03-17 VITALS
WEIGHT: 270.6 LBS | HEIGHT: 72 IN | DIASTOLIC BLOOD PRESSURE: 69 MMHG | SYSTOLIC BLOOD PRESSURE: 123 MMHG | HEART RATE: 66 BPM | OXYGEN SATURATION: 95 % | BODY MASS INDEX: 36.65 KG/M2

## 2021-03-17 DIAGNOSIS — Z98.890 POSTOPERATIVE STATE: Primary | ICD-10-CM

## 2021-03-17 PROCEDURE — 99024 POSTOP FOLLOW-UP VISIT: CPT | Performed by: PLASTIC SURGERY

## 2021-03-17 NOTE — PROGRESS NOTES
[address] Irina Bernstein  [address]  [phone]       OFFICE POST-OP NOTE    Patient Name:  Sarah Wang    :  1995    MRN:  [id number]  STATUS POST  Chief Complaint   Patient presents with    Post-Op Check     Patient states he is here to have his sutures removed from his scalp. SUBJECTIVE  Patient seen and examined. Patient status post excision of cyst on the scalp. Patient's procedure was on 3/3/20/2021. I reviewed the pathology with the patient. A copy was provided to the patient. .  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 The cysts were located on the left front, right front, and right posterior      PHYSICAL EXAM  Vital Signs:  /69 (Site: Left Upper Arm, Position: Sitting, Cuff Size: Large Adult)   Pulse 66   Ht 6' (1.829 m)   Wt 270 lb 9.6 oz (122.7 kg)   SpO2 95%   BMI 36.70 kg/m²     Incisions:  Suture line clean dry and intact. Skin:  No evidence of infection. Neurologic:  Alert & oriented x 3. Laboratory:  Dermatology Pathology Report 2021 10:36 AM [facility]   -- Diagnosis --   1.  SKIN, LEFT FRONT HEAD, EXCISION:        -  TRICHILEMMAL (PILAR) CYST. 2.  SKIN, RIGHT FRONTAL HEAD, EXCISION:        -  TRICHILEMMAL (PILAR) CYST.          3.  SKIN, RIGHT POSTERIOR HEAD, EXCISION:-  TRICHILEMMAL (PILAR)   CYST.       London Cruz.  Emily Starkey M.D.   **Electronically Signed Out**   jet/3/8/2021         Clinical Information   Operative Findings:  LT FRONT HEAD; RT FRONTAL HEAD; RT POSTERIOR HEAD   Operation Performed:  EXCISION x 3     Source of Specimen   1: JAR #1 LT FRONT HEAD   2: JAR #2 RT FRONTAL HEAD   3: JAR #3 RT POSTERIOR HEAD     Gross Description   1.  \"PETER WITT, LT FRONT HEAD\" 2.5 x 1.8 x 1.0 cm disrupted   unilocular cyst including a 1.0 x 0.2 cm tan-white skin ellipse.  The   cyst contains friable white material.  Portion 1cs.     2.  \"PETER WITT, RT FRONTAL HEAD\" 0.9 x 0.8 x 0.5 cm intact   unilocular cyst surmounted by a 0.5 x 0.3 cm tan skin ellipse.  Tissue   entirely 1cs. 3.  \"PTEER MELMS, RT POSTERIOR HEAD\" 1.8 x 1.5 x 1.0 cm disrupted   unilocular cyst that contains friable white material.  Tissue entirely   1cs.  tm       Microscopic Description   1-3.  The sections show an epithelial-lined follicular cyst undergoing   trichilemmal keratinization of the type observed in the isthmus of the   hair follicle. SURGICAL PATHOLOGY CONSULTATION         Patient Name: Dimas Porras: [id number]   Path Number: [id number]     [address], [facility] [address]. [place], [address]   [phone]   Fax: [phone]          ASSESSMENT   Diagnosis Orders   1. Postoperative state         PLAN  1. Remove all sutures  2. Follow-up in 2 weeks. Plan discussed with patient.     Electronically signed by:  Elvia Martines M.D.   3/17/2021

## 2021-03-22 ENCOUNTER — OFFICE VISIT (OUTPATIENT)
Dept: PODIATRY | Age: 26
End: 2021-03-22
Payer: COMMERCIAL

## 2021-03-22 VITALS — WEIGHT: 270 LBS | RESPIRATION RATE: 16 BRPM | TEMPERATURE: 97.7 F | BODY MASS INDEX: 36.57 KG/M2 | HEIGHT: 72 IN

## 2021-03-22 DIAGNOSIS — M76.72 PERONEAL TENDONITIS, LEFT: Primary | ICD-10-CM

## 2021-03-22 DIAGNOSIS — M79.662 PAIN IN LEFT LOWER LEG: ICD-10-CM

## 2021-03-22 PROCEDURE — 99213 OFFICE O/P EST LOW 20 MIN: CPT | Performed by: PODIATRIST

## 2021-03-22 RX ORDER — NAPROXEN 500 MG/1
500 TABLET ORAL 2 TIMES DAILY WITH MEALS
Qty: 60 TABLET | Refills: 1 | Status: SHIPPED | OUTPATIENT
Start: 2021-03-22

## 2021-03-22 NOTE — PROGRESS NOTES
[facility] PHYSICIANS  [facility]  [address] [address] [place] [address]  Dept: [phone]  Dept Fax: [phone]    RETURN PATIENT PROGRESS NOTE  Date of patient's visit: 3/22/2021  Patient's Name:  Karla Song YOB: 1995            Patient Care Team:  Beba Chávez DO as PCP - General (Family Medicine)  Beba Chávez DO as PCP - [facility] Empaneled Provider  Una Ramirez DPM as Physician (Podiatry)       Karla Song 22 y.o. male that presents for follow-up of left lower leg pain  Chief Complaint   Patient presents with    Leg Pain     lower left     Pt's primary care physician is Beba Chávez DO last seen 3/8/21  Symptoms began 1 year(s) ago and are decreased . Patient relates pain is Present. Pain is rated 2 out of 10 and is described as intermittent. Treatments prior to today's visit include: MRI left ankle and would like to discuss results today. Currently denies F/C/N/V. No Known Allergies    Past Medical History:   Diagnosis Date    Drug abuse ([facility] [address]        Prior to Admission medications    Medication Sig Start Date End Date Taking? Authorizing Provider    MG capsule Take 100 mg by mouth as needed 2/7/21   Historical Provider, MD   Melatonin 5 MG CAPS Take 5 mg by mouth as needed 2/14/21   Historical Provider, MD   naproxen (NAPROSYN) 500 MG tablet Take 1 tablet by mouth 2 times daily as needed for Pain 2/4/21   Una Ramirez DPM   buprenorphine-naloxone (SUBOXONE) 4-1 MG FILM SL film Place 1 Film under the tongue daily. Historical Provider, MD       Review of Systems    Review of Systems:  History obtained from chart review and the patient  General ROS: negative for - chills, fatigue, fever, night sweats or weight gain  Constitutional: Negative for chills, diaphoresis, fatigue, fever and unexpected weight change. Musculoskeletal: Positive for arthralgias, gait problem and joint swelling.   Neurological ROS: negative for - detail. Advised pt to remain in CAM boot as needed. Reviewed and discussed MRI with above findings. At this time surgery is not recommended as pain has decreased. Verbal and written instructions given to patient. Contact office with any questions/problems/concerns. No orders of the defined types were placed in this encounter. No orders of the defined types were placed in this encounter.        RTC in 2month(s).    3/22/2021      Electronically signed by Savana Goodwin DPM on 3/22/2021 at 10:05 AM  3/22/2021

## 2021-04-14 ENCOUNTER — OFFICE VISIT (OUTPATIENT)
Dept: SURGERY | Age: 26
End: 2021-04-14
Payer: COMMERCIAL

## 2021-04-14 VITALS
HEART RATE: 69 BPM | BODY MASS INDEX: 36.62 KG/M2 | WEIGHT: 270.4 LBS | DIASTOLIC BLOOD PRESSURE: 64 MMHG | HEIGHT: 72 IN | OXYGEN SATURATION: 96 % | SYSTOLIC BLOOD PRESSURE: 110 MMHG

## 2021-04-14 DIAGNOSIS — Z98.890 HISTORY OF REMOVAL OF CYST: Primary | ICD-10-CM

## 2021-04-14 PROCEDURE — 99213 OFFICE O/P EST LOW 20 MIN: CPT | Performed by: PLASTIC SURGERY

## 2021-04-14 NOTE — PROGRESS NOTES
[facility]  [place]  [phone]       Progress note    Patient Name:  Alison Joel    :  1995    MRN:  [id number]  STATUS POST  Chief Complaint   Patient presents with    Post-Op Check     Patient states he is here for his post-op follow-up. SUBJECTIVE  Patient seen and examined. Patient status post excision of cyst on the scalp. Patient's procedure was on 3/3/20/2021. I reviewed the pathology with the patient. A copy was provided to the patient. .  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 The cysts were located on the left front, right front, and right posterior. Allergies:   No Known Allergies      Review of Systems:   Constitutional: Negative for fever, chills, fatigue and unexpected weight change. HENT: Negative for hearing loss, sore throat and facial swelling. Eyes: Negative for pain and discharge. Respiratory: Negative for cough and shortness of breath. Cardiovascular: Negative for chest pain. Gastrointestinal: Negative for nausea, vomiting, diarrhea and constipation. Skin: Negative for pallor and rash. Neurological: Negative for seizures, syncope and numbness. Hematological: Does not bruise/bleed easily. Psychiatric/Behavioral: Negative for behavioral problems. Patient has a history of drug abuse    PHYSICAL EXAM  Vital Signs:  /64 (Site: Left Upper Arm, Position: Sitting, Cuff Size: Large Adult)   Pulse 69   Ht 6' (1.829 m)   Wt 270 lb 6.4 oz (122.7 kg)   SpO2 96%   BMI 36.67 kg/m²     Incisions: Incisions are well-healed. Patient has 1 additional dissolvable suture. We will remove that. Skin:  No evidence of infection. Neurologic:  Alert & oriented x 3.   Extremities: Patient moves all extremities    Laboratory:  Dermatology Pathology Report 2021 10:36 AM [facility]   -- Diagnosis --   1.  SKIN, LEFT FRONT HEAD, EXCISION:        - [address] [facility].     2.  SKIN, RIGHT FRONTAL HEAD, EXCISION:        -  TRICHILEMMAL (PILAR) CYST.          3.  SKIN, RIGHT POSTERIOR HEAD, EXCISION:-  TRICHILEMMAL (PILAR)   CYST.       Adri Lazcano. Neville Harada, M.D.   **Electronically Signed Out**   jet/3/8/2021         Clinical Information   Operative Findings:  LT FRONT HEAD; RT FRONTAL HEAD; RT POSTERIOR HEAD   Operation Performed:  EXCISION x 3     Source of Specimen   1: JAR #1 LT FRONT HEAD   2: JAR #2 RT FRONTAL HEAD   3: JAR #3 RT POSTERIOR HEAD     Gross Description   1.  \"PETER MELMS, LT FRONT HEAD\" 2.5 x 1.8 x 1.0 cm disrupted   unilocular cyst including a 1.0 x 0.2 cm tan-white skin ellipse.  The   cyst contains friable white material.  Portion 1cs.     2.  \"PETER MELMS, RT FRONTAL HEAD\" 0.9 x 0.8 x 0.5 cm intact   unilocular cyst surmounted by a 0.5 x 0.3 cm tan skin ellipse.  Tissue   entirely 1cs. 3.  \"PETER MELMS, RT POSTERIOR HEAD\" 1.8 x 1.5 x 1.0 cm disrupted   unilocular cyst that contains friable white material.  Tissue entirely   1cs.  tm       Microscopic Description   1-3.  The sections show an epithelial-lined follicular cyst undergoing   trichilemmal keratinization of the type observed in the isthmus of the   hair follicle. SURGICAL PATHOLOGY CONSULTATION         Patient Name: Thanh Kayr: [id number]   Path Number: [id number]     [address] [address]. [place], [address]   [phone]   Fax: [phone]          ASSESSMENT   Diagnosis Orders   1. Postoperative examination         PLAN  1. Remove sutures  2. I recommended patient to do monthly exam checks as well as yearly professional skin checks. Plan discussed with patient.     Electronically signed by:  Gissell Matos M.D.   4/14/2021

## 2022-06-29 ENCOUNTER — OFFICE VISIT (OUTPATIENT)
Dept: SURGERY | Age: 27
End: 2022-06-29
Payer: COMMERCIAL

## 2022-06-29 VITALS — HEART RATE: 100 BPM | BODY MASS INDEX: 29.8 KG/M2 | RESPIRATION RATE: 16 BRPM | WEIGHT: 220 LBS | HEIGHT: 72 IN

## 2022-06-29 DIAGNOSIS — R22.0 SCALP MASS: ICD-10-CM

## 2022-06-29 DIAGNOSIS — R22.9 MASS OF SKIN: Primary | ICD-10-CM

## 2022-06-29 PROCEDURE — 99213 OFFICE O/P EST LOW 20 MIN: CPT | Performed by: PLASTIC SURGERY

## 2022-06-29 NOTE — PROGRESS NOTES
[address] [facility]  [facility] [id number]       History and Physical     Chief Complaint   Patient presents with    Follow-up     pilar cyst of scalp DOS 3/20/22        HPI:   Aldona Habermann is a 32 y.o. male who presents with multiple cysts of the scalp. Patient states that they have been present for approximately 10 years. Patient denies any pain associated with the area. Patient is here for evaluation and treatment. . Patient states that he continues to have of the cyst there is nothing that improves the area. There is nothing that makes them go away. Medications:     Current Outpatient Medications   Medication Sig Dispense Refill    naproxen (NAPROSYN) 500 MG tablet Take 1 tablet by mouth 2 times daily (with meals) 60 tablet 1     MG capsule Take 100 mg by mouth as needed      Melatonin 5 MG CAPS Take 5 mg by mouth as needed      naproxen (NAPROSYN) 500 MG tablet Take 1 tablet by mouth 2 times daily as needed for Pain 60 tablet 0    buprenorphine-naloxone (SUBOXONE) 4-1 MG FILM SL film Place 1 Film under the tongue daily. No current facility-administered medications for this visit. Allergies:   No Known Allergies  Review of Systems:   Constitutional: Negative for fever, chills, fatigue and unexpected weight change. HENT: Negative for hearing loss, sore throat and facial swelling. Eyes: Negative for pain and discharge. Respiratory: Negative for cough and shortness of breath. Cardiovascular: Negative for chest pain. Gastrointestinal: Negative for nausea, vomiting, diarrhea and constipation. Skin: Negative for pallor and rash. Neurological: Negative for seizures, syncope and numbness. Hematological: Does not bruise/bleed easily. Psychiatric/Behavioral: Negative for behavioral problems. Patient has a history of drug abuse.     Past Medical History:   Diagnosis Date    Drug abuse [facility])      Past Surgical History:   Procedure Laterality Date    WISDOM TOOTH EXTRACTION       Social History     Socioeconomic History    Marital status: Single     Spouse name: Not on file    Number of children: Not on file    Years of education: Not on file    Highest education level: Not on file   Occupational History    Not on file   Tobacco Use    Smoking status: Never Smoker    Smokeless tobacco: Never Used   Vaping Use    Vaping Use: Never used   Substance and Sexual Activity    Alcohol use: Never    Drug use: Yes     Types: Marijuana [facility])     Comment: percocet    Sexual activity: Not on file   Other Topics Concern    Not on file   Social History Narrative    Not on file     Social Determinants of Health     Financial Resource Strain:     Difficulty of Paying Living Expenses: Not on file   Food Insecurity:     Worried About [address] in the Last Year: Not on file    Kurtis of Food in the Last Year: Not on file   Transportation Needs:     Lack of Transportation (Medical): Not on file    Lack of Transportation (Non-Medical):  Not on file   Physical Activity:     Days of Exercise per Week: Not on file    Minutes of Exercise per Session: Not on file   Stress:     Feeling of Stress : Not on file   Social Connections:     Frequency of Communication with Friends and Family: Not on file    Frequency of Social Gatherings with Friends and Family: Not on file    Attends Anabaptism Services: Not on file    Active Member of [address] [place] [address] or Organizations: Not on file    Attends Club or Organization Meetings: Not on file    Marital Status: Not on file   Intimate Partner Violence:     Fear of Current or Ex-Partner: Not on file    Emotionally Abused: Not on file    Physically Abused: Not on file    Sexually Abused: Not on file   Housing Stability:     Unable to Pay for Housing in the Last Year: Not on file    Number of [place] in the Last Year: Not on file    Unstable Housing in the Last Year: Not on file disappear spontaneously, surgical removal is a treatment option. There are many different techniques for removing skin lesions and skin tumors. ALTERNATIVE TREATMENTS  Alternative forms of management consist of not treating the skin lesion/skin tumor condition. Removal of skin lesions and some superficial skin tumors may be accomplished by other treatments including the use of liquid nitrogen (freezing), lasers, topical medications, and electric cautery. Risks and potential complications are associated with alternative forms of treatment. Deeper tumors cannot be treated by this. RISKS OF SKIN LESION/SKIN TUMOR SURGERY    I have explained to the patient that every surgical procedure involves a certain amount of risk and it is important that an understanding of these risks and the possible complications associated with them is understood. In addition, every procedure has limitations. An individual's choice to undergo a surgical procedure is based on the comparison of the risk to potential benefit. Although some of patients do not experience these complications. Bleeding- It is possible, to experience a bleeding episode during or after surgery. Intraoperative blood transfusions may be required. Should post-operative bleeding occur, it may require an emergency treatment to drain the accumulated blood or blood transfusion. Do not take any aspirin or anti-inflammatory medications for ten days before or after surgery, as this may increase the risk of bleeding. Non-prescription herbs and dietary supplements can increase the risk of surgical bleeding. Hematoma can occur at any time following injury. If blood transfusions are necessary to treat blood loss, there is the risk of blood-related infections such as hepatitis and HIV (AIDS). Heparin medications that are used to prevent blood clots in veins can produce bleeding and decreased blood platelets.   Please check with the physician who prescribed that blood thinners such as aspirin Coumadin etc. Prior to stopping them. Infection- Infection can occur after surgery. Should an infection occur, additional treatment including antibiotics, hospitalization, or additional surgery may be necessary. Scarring- All surgery leaves scars, some more visible than others. Although wound healing after a surgical procedure is expected, abnormal scars may occur within the skin and deeper tissues. Scars may be unattractive and of different color than the surrounding skin tone. Scar appearance may also vary within the same scar. There is the possibility of visible marks from sutures used to close the wound after the removal of skin lesions and tumors. There is the possibility that scars may limit motion and function. In some cases, scars may require surgical revision or treatment. Obesity: If you're BMI is greater than 30 you may have a higher chance of complications. This may include but not limited to wound healing and infections. Also, if you have other medical problems such as diabetes and hypertension it may affect your healing as well as your surgical results in bleeding. Damage to Deeper Structures- There is the potential for injury to deeper structures including nerves, blood vessels, muscles, and lungs (pneumothorax) during any surgical procedure. The potential for this to occur varies according to where on the body surgery is being performed. Injury to deeper structures may be temporary or permanent. Cancer- In some situations, a skin lesion or tumor that appears to be benign may be determined to be cancerous after laboratory analysis. Additional treatments or surgery may be necessary. Recurrence- In some situation, skin lesions and tumors can recur after surgical excision. Additional treatment or secondary surgery may be necessary. Skin Discoloration / Swelling- Some bruising and swelling normally occurs following surgery.   The skin in or near the surgical site can appear either lighter or darker than surrounding skin. Although uncommon, swelling and skin discoloration may persist for long periods of time and, in rare situations, may be permanent. Skin Sensitivity- Itching, tenderness, or exaggerated responses to hot or cold temperatures may occur after surgery. Usually this resolves during healing, but in some situations, it may be chronic, permanent. Pain- You will experience pain after your surgery. Pain of varying intensity and duration may occur and persist after surgery. Chronic pain may occur from nerves becoming trapped in scar tissue. Sutures- Some surgical techniques use deep sutures. You may notice these sutures after your surgery. Sutures may spontaneously poke through the skin, become visible or produce irritation that requires removal.  Delayed Healing- Wound disruption or delayed wound healing is possible. Some areas of the skin may not heal normally and may take a long time to heal.  Some areas of skin may die, requiring frequent dressing changes or further surgery to remove the non-healed tissue. Smokers have a greater risk of skin loss and wound healing complications. Skin Contour Irregularities- Contour irregularities and depressions may occur after surgery. Visible and palpable wrinkling of skin can occur. Residual skin irregularities at the ends of the incisions or dog ears are always a possibility and may require additional surgery. This may improve with time, or it can be surgically corrected. Allergic Reactions- In some cases, local allergies to tape, suture materials and glues, blood products, topical preparations or injected agents have been reported. Serious systemic reactions including shock (anaphylaxis) may occur to drugs used during surgery and prescription medications. Allergic reactions may require additional treatment. Surgical Anesthesia- Both local and general anesthesia involve risk.   There is the possibility of complications, injury, and even death from all forms of surgical anesthesia or sedation. Change in Skin Sensation- It is common to experience diminished (or loss) of skin sensation in areas that have had surgery. Diminished (or complete loss of skin sensation) may not totally resolve. Shock- In rare circumstances, your surgical procedure can cause severe trauma, particularly when multiple or extensive procedures are performed. Although serious complications are infrequent, infections or excessive fluid loss can lead to severe illness and even death. If surgical shock occurs, hospitalization and additional treatment would be necessary. Unsatisfactory Result- There is no guarantee or warranty expressed or implied, on the results that may be obtained. There is the possibility of a poor result from the removal of skin lesions and tumors. This would include risks such as unacceptable visible deformities, loss of function, poor healing, wound disruption, skin death and loss of sensation. You may be disappointed with the results of reconstructive surgery. It may be necessary to perform additional surgery to improve your results. Cardiac and Pulmonary Complications- Surgery, especially longer procedures, may be associated with the formation of, or increase in, blood clots in the venous system. Pulmonary complications may occur secondarily to both blood clots (pulmonary emboli), fat deposits (fat emboli) or partial collapse of the lungs after general anesthesia. Pulmonary and fat emboli can be life-threatening or fatal in some circumstances. Air travel, inactivity and other conditions may increase the incidence of blood clots traveling to the lungs causing a major blood clot that may result in death. It is important to discuss with your physician any past history of blood clots or swollen legs that may contribute to this condition.   Cardiac complications are a risk with any surgery and anesthesia, even in patients without symptoms. If you experience shortness of breath, chest pains, or unusual heart beats, seek medical attention immediately. Should any of these complications occur, you may require hospitalization and additional treatment. Smoking, Second-Hand Smoke Exposure, Nicotine Products (Patch, Gum, Nasal Spray)-   Patients who are currently smoking, use tobacco products, or nicotine products (patch, gum, or nasal spray) are at a greater risk for significant surgical complications of skin dying, delayed healing, and additional scarring. Individuals exposed to second-hand smoke are also at potential risk for similar complications attributable to nicotine exposure. Additionally, smoking may have a significant negative effect on anesthesia and recovery from anesthesia, with coughing and possibly increased bleeding. Individuals who are not exposed to tobacco smoke or nicotine-containing products have a significantly lower risk of this type of complication. It is important to refrain from smoking at least 8-week weeks before and after surgery. This may apply to secondhand smoking. Mental Health Disorders and Surgery- It is important that all patients seeking to undergo surgery have realistic expectations that focus on improvement rather than perfection. Complications or less than satisfactory results are sometimes unavoidable, may require additional surgery and often are stressful. Please openly discuss with your surgeon, prior to surgery, any history that you may have of significant emotional depression or mental health disorders. Although many individuals may benefit psychologically from the results of surgery, effects on mental health cannot be accurately predicted. Medications- There are many adverse reactions that occur as the result of taking over the counter, herbal, and/or prescription medications.   Be sure to check with your physician about any drug interactions that may exist with medications which you are already taking. If you have an adverse reaction, stop the drugs immediately and call your plastic surgeon for further instructions. If the reaction is severe, go immediately to the nearest emergency room. When taking the prescribed pain medications after surgery, realize that they can affect your thought process and coordination. Do not drive, do not operate complex equipment, do not make any important decisions, and do not drink any alcohol while taking these medications. Be sure to take your prescribed medication only as directed. ADDITIONAL SURGERY NECESSARY  Should complications occur, additional surgery or other treatments may be necessary. Even though risks and complications occur infrequently, the risks cited are the ones that are particularly associated with skin lesion and skin tumor removal.  Other complications and risks can occur but are even more uncommon. The practice of medicine and surgery is not an exact science. Although good results are expected, there is no guarantee or warranty expressed or implied, on the results that may be obtained. PATIENT COMPLIANCE   Follow all physician instructions carefully; this is essential for the success of your outcome. It is important that the surgical incisions are not subjected to excessive force, swelling, abrasion, or motion during the time of healing. Protective dressings and drains should not be removed unless instructed by your plastic surgeon. Successful post-operative function depends on both surgery and subsequent care. Physical activity that increases your pulse or heart rate may cause bruising, swelling, fluid accumulation and the need for return to surgery. It is wise to refrain from intimate physical activities after surgery until your physician states it is safe. It is important that you participate in follow-up care, return for aftercare, and promote your recovery after surgery. Electronically signed by:  Ruth Puentes MD 6/29/2022

## 2022-07-05 ENCOUNTER — TELEPHONE (OUTPATIENT)
Dept: SURGERY | Age: 27
End: 2022-07-05

## 2023-06-30 ENCOUNTER — TELEPHONE (OUTPATIENT)
Dept: FAMILY MEDICINE CLINIC | Age: 28
End: 2023-06-30

## 2023-10-03 ENCOUNTER — TELEPHONE (OUTPATIENT)
Dept: FAMILY MEDICINE CLINIC | Age: 28
End: 2023-10-03

## 2023-10-03 NOTE — TELEPHONE ENCOUNTER
Patient wants to know if you can prescribe him his Saboxen, rehab kicked him out due to him unable to make his meetings due to his job. They told him he needed to quit his job.     Please advise    Billy Pack

## 2023-10-04 NOTE — TELEPHONE ENCOUNTER
Patient states he was attending [facility] on [address] [place] [address]. What do you recommend?

## 2023-10-04 NOTE — TELEPHONE ENCOUNTER
Have him look into [facility] - two places in [place] [facility] where he can continue his Suboxone treatment

## 2023-10-04 NOTE — TELEPHONE ENCOUNTER
No unfortunately I cannot. But we can try to help him find another rehab. Which one was he attending?

## 2024-04-07 ASSESSMENT — PATIENT HEALTH QUESTIONNAIRE - PHQ9
SUM OF ALL RESPONSES TO PHQ QUESTIONS 1-9: 0
SUM OF ALL RESPONSES TO PHQ QUESTIONS 1-9: 0
1. LITTLE INTEREST OR PLEASURE IN DOING THINGS: NOT AT ALL
2. FEELING DOWN, DEPRESSED OR HOPELESS: NOT AT ALL
2. FEELING DOWN, DEPRESSED OR HOPELESS: NOT AT ALL
SUM OF ALL RESPONSES TO PHQ9 QUESTIONS 1 & 2: 0
1. LITTLE INTEREST OR PLEASURE IN DOING THINGS: NOT AT ALL
SUM OF ALL RESPONSES TO PHQ QUESTIONS 1-9: 0
SUM OF ALL RESPONSES TO PHQ9 QUESTIONS 1 & 2: 0
SUM OF ALL RESPONSES TO PHQ QUESTIONS 1-9: 0

## 2024-04-08 ENCOUNTER — OFFICE VISIT (OUTPATIENT)
Dept: FAMILY MEDICINE CLINIC | Age: 29
End: 2024-04-08
Payer: COMMERCIAL

## 2024-04-08 VITALS
BODY MASS INDEX: 35.26 KG/M2 | SYSTOLIC BLOOD PRESSURE: 112 MMHG | HEART RATE: 83 BPM | OXYGEN SATURATION: 99 % | DIASTOLIC BLOOD PRESSURE: 70 MMHG | WEIGHT: 260 LBS

## 2024-04-08 DIAGNOSIS — R73.01 IFG (IMPAIRED FASTING GLUCOSE): ICD-10-CM

## 2024-04-08 DIAGNOSIS — Z00.00 ENCOUNTER FOR WELL ADULT EXAM WITHOUT ABNORMAL FINDINGS: Primary | ICD-10-CM

## 2024-04-08 DIAGNOSIS — E78.5 DYSLIPIDEMIA: ICD-10-CM

## 2024-04-08 DIAGNOSIS — F11.90 OPIOID USE DISORDER: ICD-10-CM

## 2024-04-08 PROCEDURE — 99395 PREV VISIT EST AGE 18-39: CPT | Performed by: FAMILY MEDICINE

## 2024-04-08 RX ORDER — BUPRENORPHINE AND NALOXONE 2; .5 MG/1; MG/1
FILM, SOLUBLE BUCCAL; SUBLINGUAL
COMMUNITY
Start: 2023-02-01

## 2024-04-08 SDOH — ECONOMIC STABILITY: FOOD INSECURITY: WITHIN THE PAST 12 MONTHS, YOU WORRIED THAT YOUR FOOD WOULD RUN OUT BEFORE YOU GOT MONEY TO BUY MORE.: NEVER TRUE

## 2024-04-08 SDOH — ECONOMIC STABILITY: FOOD INSECURITY: WITHIN THE PAST 12 MONTHS, THE FOOD YOU BOUGHT JUST DIDN'T LAST AND YOU DIDN'T HAVE MONEY TO GET MORE.: NEVER TRUE

## 2024-04-08 SDOH — ECONOMIC STABILITY: HOUSING INSECURITY
IN THE LAST 12 MONTHS, WAS THERE A TIME WHEN YOU DID NOT HAVE A STEADY PLACE TO SLEEP OR SLEPT IN A SHELTER (INCLUDING NOW)?: NO

## 2024-04-08 SDOH — ECONOMIC STABILITY: INCOME INSECURITY: HOW HARD IS IT FOR YOU TO PAY FOR THE VERY BASICS LIKE FOOD, HOUSING, MEDICAL CARE, AND HEATING?: NOT HARD AT ALL

## 2024-04-08 ASSESSMENT — ENCOUNTER SYMPTOMS
RESPIRATORY NEGATIVE: 1
GASTROINTESTINAL NEGATIVE: 1
ALLERGIC/IMMUNOLOGIC NEGATIVE: 1
EYES NEGATIVE: 1

## 2024-04-08 NOTE — PATIENT INSTRUCTIONS
Advance Care Planning     Advance Care Planning opens a door to talk about and write down your wishes before a sudden accident or illness.  Make your goals, values, and preferences known.     This puts you in the ’s seat and helps others know what matters most to you so they won’t have to guess.      Where can you learn more?    Go to [email or web address]/patient-resources/advance-care-planning   to learn how to:    Name someone you trust to make healthcare decisions for you, only if you can’t. (Healthcare Power of )    Document your wishes for care if you were seriously ill and not expected to recover or are approaching end of life. (Advance Directive or Living Will)    The same page can be found using the QR code below.                Well Visit, Ages 18 to 65: Care Instructions  Well visits can help you stay healthy. Your doctor has checked your overall health and may have suggested ways to take good care of yourself. Your doctor also may have recommended tests. You can help prevent illness with healthy eating, good sleep, vaccinations, regular exercise, and other steps.    Get the tests that you and your doctor decide on. Depending on your age and risks, examples might include screening for diabetes; hepatitis C; HIV; and cervical, breast, lung, and colon cancer. Screening helps find diseases before any symptoms appear.   Eat healthy foods. Choose fruits, vegetables, whole grains, lean protein, and low-fat dairy foods. Limit saturated fat and reduce salt.     Limit alcohol. Men should have no more than 2 drinks a day. Women should have no more than 1. For some people, no alcohol is the best choice.   Exercise. Get at least 30 minutes of exercise on most days of the week. Walking can be a good choice.     Reach and stay at your healthy weight. This will lower your risk for many health problems.   Take care of your mental health. Try to stay connected with friends, family, and community, and find

## 2024-04-08 NOTE — PROGRESS NOTES
Well Adult Note  Name: Ashok Song Today’s Date: 2024   MRN: [id number] Sex: Male   Age: 28 y.o. Ethnicity: Non- / Non    : 1995 Race: White (non-)      Ashok Song is here for well adult exam.  History:    Still works at [employer]    Diet/Nutrition - in general, a \"healthy\" diet     Exercise/Activity -  gets his activity at work    Sleep Habits - normal  Diabetes - none  Cardiovascular Health - Hypertension? none Hyperlipidemia? none  Hearing - normal to conversation  Vision - No Problems   Tobacco/Smoking - Smoker? non-smoker Vape? No Smokeless? Yes plan to quit after being done with subs  Alcohol - none  Illicit Drugs - on suboxone for vAa - 2mg and plans to wean down and off  Skin Cancer - Fair skin/previous sun exposure? Yes Suspicious lesion? No - burn spot on RUE forearm is healed well  Depression - denies  Anxiety - denies      Review of Systems   Constitutional: Negative.    HENT: Negative.     Eyes: Negative.    Respiratory: Negative.     Cardiovascular: Negative.    Gastrointestinal: Negative.    Endocrine: Negative.    Genitourinary: Negative.    Musculoskeletal: Negative.    Skin: Negative.    Allergic/Immunologic: Negative.    Neurological: Negative.    Hematological: Negative.    Psychiatric/Behavioral: Negative.     All other systems reviewed and are negative.      No Known Allergies      Prior to Visit Medications    Medication Sig Taking? Authorizing Provider   buprenorphine-naloxone (SUBOXONE) 2-0.5 MG FILM SL film  Yes Provider, MD Bola         Past Medical History:   Diagnosis Date    Drug abuse (HCC)        Past Surgical History:   Procedure Laterality Date    WISDOM TOOTH EXTRACTION         No family history on file.    Social History     Tobacco Use    Smoking status: Never    Smokeless tobacco: Never   Vaping Use    Vaping Use: Never used   Substance Use Topics    Alcohol use: Never    Drug use: Yes     Types: Marijuana (Weed)